# Patient Record
Sex: FEMALE | Race: WHITE | NOT HISPANIC OR LATINO | Employment: OTHER | URBAN - METROPOLITAN AREA
[De-identification: names, ages, dates, MRNs, and addresses within clinical notes are randomized per-mention and may not be internally consistent; named-entity substitution may affect disease eponyms.]

---

## 2023-06-06 ENCOUNTER — OFFICE VISIT (OUTPATIENT)
Dept: PRIMARY CARE | Facility: CLINIC | Age: 68
End: 2023-06-06
Payer: MEDICARE

## 2023-06-06 VITALS
SYSTOLIC BLOOD PRESSURE: 122 MMHG | HEIGHT: 66 IN | HEART RATE: 92 BPM | TEMPERATURE: 97.6 F | WEIGHT: 195.8 LBS | DIASTOLIC BLOOD PRESSURE: 68 MMHG | OXYGEN SATURATION: 97 % | BODY MASS INDEX: 31.47 KG/M2

## 2023-06-06 DIAGNOSIS — E78.5 BORDERLINE HYPERLIPIDEMIA: ICD-10-CM

## 2023-06-06 DIAGNOSIS — I15.9 BENIGN SECONDARY HYPERTENSION: Primary | ICD-10-CM

## 2023-06-06 DIAGNOSIS — M75.21 BICEPS TENDINITIS OF RIGHT UPPER EXTREMITY: ICD-10-CM

## 2023-06-06 DIAGNOSIS — M75.52 BURSITIS OF LEFT SHOULDER: ICD-10-CM

## 2023-06-06 DIAGNOSIS — K21.9 GASTROESOPHAGEAL REFLUX DISEASE WITHOUT ESOPHAGITIS: ICD-10-CM

## 2023-06-06 DIAGNOSIS — F51.01 PRIMARY INSOMNIA: ICD-10-CM

## 2023-06-06 PROBLEM — M75.121 COMPLETE TEAR OF RIGHT ROTATOR CUFF: Status: ACTIVE | Noted: 2023-06-06

## 2023-06-06 PROBLEM — M51.9 LUMBAR DISC DISEASE: Status: ACTIVE | Noted: 2023-06-06

## 2023-06-06 PROBLEM — K29.70 GASTRITIS: Status: ACTIVE | Noted: 2023-06-06

## 2023-06-06 PROBLEM — H81.10 BENIGN POSITIONAL VERTIGO: Status: ACTIVE | Noted: 2023-06-06

## 2023-06-06 PROBLEM — R53.83 FATIGUE: Status: ACTIVE | Noted: 2023-06-06

## 2023-06-06 PROBLEM — M67.40 GANGLION CYST: Status: ACTIVE | Noted: 2023-06-06

## 2023-06-06 PROBLEM — M50.90 CERVICAL DISC DISEASE: Status: ACTIVE | Noted: 2023-06-06

## 2023-06-06 PROBLEM — K58.9 IRRITABLE BOWEL SYNDROME: Status: ACTIVE | Noted: 2023-06-06

## 2023-06-06 PROBLEM — M17.12 OSTEOARTHRITIS OF LEFT KNEE: Status: ACTIVE | Noted: 2023-06-06

## 2023-06-06 PROBLEM — L72.3 INFECTED SEBACEOUS CYST: Status: ACTIVE | Noted: 2023-06-06

## 2023-06-06 PROBLEM — M75.121 COMPLETE TEAR OF RIGHT ROTATOR CUFF: Status: RESOLVED | Noted: 2023-06-06 | Resolved: 2023-06-06

## 2023-06-06 PROBLEM — H26.9 CATARACTS, BILATERAL: Status: ACTIVE | Noted: 2023-06-06

## 2023-06-06 PROBLEM — G47.00 INSOMNIA: Status: ACTIVE | Noted: 2023-06-06

## 2023-06-06 PROBLEM — J30.1 ALLERGIC RHINITIS DUE TO POLLEN: Status: ACTIVE | Noted: 2023-06-06

## 2023-06-06 PROBLEM — R10.84 GENERALIZED ABDOMINAL PAIN: Status: ACTIVE | Noted: 2023-06-06

## 2023-06-06 PROBLEM — K22.70 BARRETT'S ESOPHAGUS: Status: ACTIVE | Noted: 2023-06-06

## 2023-06-06 PROBLEM — I82.402 LEFT LEG DVT (MULTI): Status: ACTIVE | Noted: 2023-06-06

## 2023-06-06 PROBLEM — K82.4 GALLBLADDER POLYP: Status: ACTIVE | Noted: 2023-06-06

## 2023-06-06 PROBLEM — L08.9 INFECTED SEBACEOUS CYST: Status: ACTIVE | Noted: 2023-06-06

## 2023-06-06 PROCEDURE — 96372 THER/PROPH/DIAG INJ SC/IM: CPT | Performed by: FAMILY MEDICINE

## 2023-06-06 PROCEDURE — 1160F RVW MEDS BY RX/DR IN RCRD: CPT | Performed by: FAMILY MEDICINE

## 2023-06-06 PROCEDURE — 3078F DIAST BP <80 MM HG: CPT | Performed by: FAMILY MEDICINE

## 2023-06-06 PROCEDURE — 99214 OFFICE O/P EST MOD 30 MIN: CPT | Performed by: FAMILY MEDICINE

## 2023-06-06 PROCEDURE — 1036F TOBACCO NON-USER: CPT | Performed by: FAMILY MEDICINE

## 2023-06-06 PROCEDURE — 3074F SYST BP LT 130 MM HG: CPT | Performed by: FAMILY MEDICINE

## 2023-06-06 PROCEDURE — 1159F MED LIST DOCD IN RCRD: CPT | Performed by: FAMILY MEDICINE

## 2023-06-06 RX ORDER — BENZONATATE 200 MG/1
CAPSULE ORAL
COMMUNITY
Start: 2022-10-03 | End: 2023-06-06 | Stop reason: ALTCHOICE

## 2023-06-06 RX ORDER — HYDROCHLOROTHIAZIDE 25 MG/1
25 TABLET ORAL DAILY
COMMUNITY
End: 2023-06-06 | Stop reason: SDUPTHER

## 2023-06-06 RX ORDER — LIDOCAINE HYDROCHLORIDE 10 MG/ML
1 INJECTION INFILTRATION; PERINEURAL ONCE
Status: COMPLETED | OUTPATIENT
Start: 2023-06-06 | End: 2023-06-06

## 2023-06-06 RX ORDER — PANTOPRAZOLE SODIUM 40 MG/1
1 TABLET, DELAYED RELEASE ORAL DAILY
COMMUNITY
End: 2023-06-06 | Stop reason: SDUPTHER

## 2023-06-06 RX ORDER — ROSUVASTATIN CALCIUM 10 MG/1
10 TABLET, COATED ORAL DAILY
COMMUNITY
End: 2023-10-09

## 2023-06-06 RX ORDER — PANTOPRAZOLE SODIUM 40 MG/1
40 TABLET, DELAYED RELEASE ORAL DAILY
Qty: 30 TABLET | Refills: 2 | Status: SHIPPED | OUTPATIENT
Start: 2023-06-06 | End: 2024-05-14

## 2023-06-06 RX ORDER — BROMPHENIRAMINE MALEATE, PSEUDOEPHEDRINE HYDROCHLORIDE, AND DEXTROMETHORPHAN HYDROBROMIDE 2; 30; 10 MG/5ML; MG/5ML; MG/5ML
SYRUP ORAL
COMMUNITY
Start: 2022-10-03 | End: 2023-06-06 | Stop reason: ALTCHOICE

## 2023-06-06 RX ORDER — TRAZODONE HYDROCHLORIDE 50 MG/1
50 TABLET ORAL NIGHTLY PRN
Qty: 30 TABLET | Refills: 5 | Status: SHIPPED | OUTPATIENT
Start: 2023-06-06 | End: 2024-06-05

## 2023-06-06 RX ORDER — MELOXICAM 15 MG/1
15 TABLET ORAL DAILY
Qty: 90 TABLET | Refills: 3 | Status: SHIPPED | OUTPATIENT
Start: 2023-06-06

## 2023-06-06 RX ORDER — MELOXICAM 15 MG/1
15 TABLET ORAL DAILY
COMMUNITY
End: 2023-06-06 | Stop reason: SDUPTHER

## 2023-06-06 RX ORDER — METHYLPREDNISOLONE ACETATE 80 MG/ML
80 INJECTION, SUSPENSION INTRA-ARTICULAR; INTRALESIONAL; INTRAMUSCULAR; SOFT TISSUE ONCE
Status: COMPLETED | OUTPATIENT
Start: 2023-06-06 | End: 2023-06-06

## 2023-06-06 RX ORDER — PREDNISONE 20 MG/1
40 TABLET ORAL DAILY
COMMUNITY
Start: 2022-10-05 | End: 2023-06-06 | Stop reason: ALTCHOICE

## 2023-06-06 RX ORDER — ALBUTEROL SULFATE 90 UG/1
AEROSOL, METERED RESPIRATORY (INHALATION)
COMMUNITY
Start: 2022-10-05

## 2023-06-06 RX ORDER — PREDNISOLONE ACETATE 10 MG/ML
SUSPENSION/ DROPS OPHTHALMIC
COMMUNITY
Start: 2023-04-10

## 2023-06-06 RX ORDER — HYDROCHLOROTHIAZIDE 25 MG/1
25 TABLET ORAL DAILY
Qty: 90 TABLET | Refills: 3 | Status: SHIPPED | OUTPATIENT
Start: 2023-06-06

## 2023-06-06 RX ORDER — MOXIFLOXACIN 5 MG/ML
SOLUTION/ DROPS OPHTHALMIC
COMMUNITY
Start: 2023-04-10 | End: 2023-10-30 | Stop reason: ALTCHOICE

## 2023-06-06 RX ADMIN — LIDOCAINE HYDROCHLORIDE 10 MG: 10 INJECTION INFILTRATION; PERINEURAL at 17:06

## 2023-06-06 RX ADMIN — METHYLPREDNISOLONE ACETATE 80 MG: 80 INJECTION, SUSPENSION INTRA-ARTICULAR; INTRALESIONAL; INTRAMUSCULAR; SOFT TISSUE at 17:07

## 2023-06-06 ASSESSMENT — PAIN SCALES - GENERAL: PAINLEVEL: 7

## 2023-06-06 ASSESSMENT — ENCOUNTER SYMPTOMS
CONSTITUTIONAL NEGATIVE: 1
SLEEP DISTURBANCE: 1

## 2023-06-06 NOTE — PROGRESS NOTES
"Subjective   Patient ID: Adri Muir is a 68 y.o. female who presents for c/o (C/o right shoulder pain x2 wks).    HPI     Review of Systems   Constitutional: Negative.    Musculoskeletal:         Pain especially in the right shoulder although there is some pain in the left shoulder secondary to doing some drywalling at home.  She does have a history of a right rotator cuff repair   Psychiatric/Behavioral:  Positive for sleep disturbance.        Objective   /68 (BP Location: Left arm)   Pulse 92   Temp 36.4 °C (97.6 °F) (Temporal)   Ht 1.676 m (5' 6\")   Wt 88.8 kg (195 lb 12.8 oz)   SpO2 97%   BMI 31.60 kg/m²     Physical Exam  Vitals and nursing note reviewed.   Cardiovascular:      Rate and Rhythm: Regular rhythm.   Pulmonary:      Breath sounds: Normal breath sounds.   Musculoskeletal:      Comments: Decreased elevation of the right shoulder with tenderness in her biceps tendon.  Rotator cuff is intact.     Patient ID: Adri Muir is a 68 y.o. female.    Procedures after sterile preparation the right shoulder was injected with Decadron Depo-Medrol and lidocaine.    Assessment/Plan   Problem List Items Addressed This Visit          Nervous    Insomnia       Circulatory    Benign secondary hypertension - Primary       Digestive    Gastroesophageal reflux disease       Musculoskeletal    Bursitis of left shoulder       Other    Borderline hyperlipidemia     Other Visit Diagnoses       Biceps tendinitis of right upper extremity                   "

## 2023-06-15 ENCOUNTER — LAB (OUTPATIENT)
Dept: LAB | Facility: LAB | Age: 68
End: 2023-06-15
Payer: MEDICARE

## 2023-06-15 DIAGNOSIS — E78.5 BORDERLINE HYPERLIPIDEMIA: ICD-10-CM

## 2023-06-15 LAB
CHOLESTEROL (MG/DL) IN SER/PLAS: 220 MG/DL (ref 0–199)
CHOLESTEROL IN HDL (MG/DL) IN SER/PLAS: 79.6 MG/DL
CHOLESTEROL/HDL RATIO: 2.8
LDL: 126 MG/DL (ref 0–99)
TRIGLYCERIDE (MG/DL) IN SER/PLAS: 71 MG/DL (ref 0–149)
VLDL: 14 MG/DL (ref 0–40)

## 2023-06-15 PROCEDURE — 36415 COLL VENOUS BLD VENIPUNCTURE: CPT

## 2023-06-15 PROCEDURE — 80061 LIPID PANEL: CPT

## 2023-07-10 PROBLEM — K80.20 GALLSTONES: Status: ACTIVE | Noted: 2023-07-10

## 2023-07-10 PROBLEM — K83.5 BILIARY CYST: Status: ACTIVE | Noted: 2023-07-10

## 2023-07-10 PROBLEM — K21.9 ESOPHAGEAL REFLUX: Status: ACTIVE | Noted: 2023-07-10

## 2023-07-10 PROBLEM — H00.029 INTERNAL HORDEOLUM: Status: ACTIVE | Noted: 2023-07-10

## 2023-07-10 RX ORDER — ACETAMINOPHEN AND CODEINE PHOSPHATE 300; 30 MG/1; MG/1
TABLET ORAL
COMMUNITY
End: 2023-10-30 | Stop reason: ALTCHOICE

## 2023-07-10 RX ORDER — LEVOCETIRIZINE DIHYDROCHLORIDE 5 MG/1
5 TABLET, FILM COATED ORAL
COMMUNITY
Start: 2017-02-08

## 2023-07-10 RX ORDER — MECLIZINE HYDROCHLORIDE 25 MG/1
25 TABLET ORAL
COMMUNITY
Start: 2016-06-28 | End: 2023-10-30 | Stop reason: ALTCHOICE

## 2023-07-10 RX ORDER — MISOPROSTOL 200 UG/1
TABLET ORAL
COMMUNITY
End: 2023-10-30 | Stop reason: ALTCHOICE

## 2023-07-10 RX ORDER — OXYCODONE AND ACETAMINOPHEN 5; 325 MG/1; MG/1
TABLET ORAL
COMMUNITY
Start: 2017-09-14 | End: 2023-10-30 | Stop reason: ALTCHOICE

## 2023-07-10 RX ORDER — FAMOTIDINE 20 MG/1
20 TABLET, FILM COATED ORAL 2 TIMES DAILY
COMMUNITY
Start: 2017-08-28 | End: 2023-10-30 | Stop reason: ALTCHOICE

## 2023-07-10 RX ORDER — CROMOLYN SODIUM 40 MG/ML
SOLUTION/ DROPS OPHTHALMIC
COMMUNITY

## 2023-07-10 RX ORDER — ONDANSETRON 4 MG/1
4 TABLET, FILM COATED ORAL EVERY 8 HOURS PRN
COMMUNITY
Start: 2020-12-16 | End: 2023-10-30 | Stop reason: ALTCHOICE

## 2023-07-21 ENCOUNTER — OFFICE VISIT (OUTPATIENT)
Dept: PRIMARY CARE | Facility: CLINIC | Age: 68
End: 2023-07-21
Payer: MEDICARE

## 2023-07-21 VITALS
WEIGHT: 191.6 LBS | DIASTOLIC BLOOD PRESSURE: 78 MMHG | OXYGEN SATURATION: 98 % | HEIGHT: 66 IN | TEMPERATURE: 97.6 F | SYSTOLIC BLOOD PRESSURE: 122 MMHG | HEART RATE: 63 BPM | BODY MASS INDEX: 30.79 KG/M2

## 2023-07-21 DIAGNOSIS — M75.21 BICEPS TENDINITIS OF RIGHT UPPER EXTREMITY: Primary | ICD-10-CM

## 2023-07-21 PROCEDURE — 99213 OFFICE O/P EST LOW 20 MIN: CPT | Performed by: FAMILY MEDICINE

## 2023-07-21 PROCEDURE — 1125F AMNT PAIN NOTED PAIN PRSNT: CPT | Performed by: FAMILY MEDICINE

## 2023-07-21 PROCEDURE — 1159F MED LIST DOCD IN RCRD: CPT | Performed by: FAMILY MEDICINE

## 2023-07-21 PROCEDURE — 1160F RVW MEDS BY RX/DR IN RCRD: CPT | Performed by: FAMILY MEDICINE

## 2023-07-21 PROCEDURE — 3078F DIAST BP <80 MM HG: CPT | Performed by: FAMILY MEDICINE

## 2023-07-21 PROCEDURE — 3074F SYST BP LT 130 MM HG: CPT | Performed by: FAMILY MEDICINE

## 2023-07-21 PROCEDURE — 1036F TOBACCO NON-USER: CPT | Performed by: FAMILY MEDICINE

## 2023-07-21 RX ORDER — METHYLPREDNISOLONE 4 MG/1
TABLET ORAL
Qty: 21 TABLET | Refills: 0 | Status: SHIPPED | OUTPATIENT
Start: 2023-07-21 | End: 2023-07-28

## 2023-07-21 ASSESSMENT — PAIN SCALES - GENERAL: PAINLEVEL: 6

## 2023-07-21 NOTE — PROGRESS NOTES
"Subjective   Patient ID: Adri Muir is a 68 y.o. female who presents for c/o (C/o right shoulder problems ).    HPI     Review of Systems   Musculoskeletal:         Persistent pain right shoulder with range of motion       Objective   /78 (BP Location: Left arm)   Pulse 63   Temp 36.4 °C (97.6 °F) (Temporal)   Ht 1.676 m (5' 6\")   Wt 86.9 kg (191 lb 9.6 oz)   SpO2 98%   BMI 30.93 kg/m²     Physical Exam  Vitals and nursing note reviewed.   Musculoskeletal:      Comments: Tenderness right shoulder at the biceps tendon.  Full range of motion rotator cuff is intact         Assessment/Plan   Problem List Items Addressed This Visit    None  Visit Diagnoses       Biceps tendinitis of right upper extremity    -  Primary    Relevant Medications    methylPREDNISolone (Medrol Dospak) 4 mg tablets               "

## 2023-10-09 DIAGNOSIS — E78.5 HYPERLIPIDEMIA, UNSPECIFIED: ICD-10-CM

## 2023-10-09 RX ORDER — BENZONATATE 200 MG/1
CAPSULE ORAL
COMMUNITY
End: 2023-10-30 | Stop reason: ALTCHOICE

## 2023-10-09 RX ORDER — ROSUVASTATIN CALCIUM 10 MG/1
10 TABLET, COATED ORAL DAILY
Qty: 90 TABLET | Refills: 3 | Status: SHIPPED | OUTPATIENT
Start: 2023-10-09

## 2023-10-09 RX ORDER — AZITHROMYCIN 250 MG/1
TABLET, FILM COATED ORAL
COMMUNITY
End: 2023-10-30 | Stop reason: ALTCHOICE

## 2023-10-09 RX ORDER — BROMPHENIRAMINE MALEATE, PSEUDOEPHEDRINE HYDROCHLORIDE, AND DEXTROMETHORPHAN HYDROBROMIDE 2; 30; 10 MG/5ML; MG/5ML; MG/5ML
SYRUP ORAL
COMMUNITY
End: 2023-10-30 | Stop reason: ALTCHOICE

## 2023-10-09 RX ORDER — PREDNISONE 20 MG/1
2 TABLET ORAL DAILY
COMMUNITY
End: 2023-10-30 | Stop reason: ALTCHOICE

## 2023-10-16 ENCOUNTER — OFFICE VISIT (OUTPATIENT)
Dept: PRIMARY CARE | Facility: CLINIC | Age: 68
End: 2023-10-16
Payer: MEDICARE

## 2023-10-16 VITALS
OXYGEN SATURATION: 96 % | BODY MASS INDEX: 29.92 KG/M2 | DIASTOLIC BLOOD PRESSURE: 74 MMHG | HEIGHT: 66 IN | HEART RATE: 72 BPM | TEMPERATURE: 97.6 F | SYSTOLIC BLOOD PRESSURE: 122 MMHG | WEIGHT: 186.2 LBS

## 2023-10-16 DIAGNOSIS — M75.21 BICEPS TENDINITIS OF RIGHT UPPER EXTREMITY: Primary | ICD-10-CM

## 2023-10-16 DIAGNOSIS — I15.9 BENIGN SECONDARY HYPERTENSION: ICD-10-CM

## 2023-10-16 DIAGNOSIS — E78.5 BORDERLINE HYPERLIPIDEMIA: ICD-10-CM

## 2023-10-16 PROCEDURE — 1125F AMNT PAIN NOTED PAIN PRSNT: CPT | Performed by: FAMILY MEDICINE

## 2023-10-16 PROCEDURE — 1160F RVW MEDS BY RX/DR IN RCRD: CPT | Performed by: FAMILY MEDICINE

## 2023-10-16 PROCEDURE — 99213 OFFICE O/P EST LOW 20 MIN: CPT | Performed by: FAMILY MEDICINE

## 2023-10-16 PROCEDURE — 1159F MED LIST DOCD IN RCRD: CPT | Performed by: FAMILY MEDICINE

## 2023-10-16 PROCEDURE — 1036F TOBACCO NON-USER: CPT | Performed by: FAMILY MEDICINE

## 2023-10-16 PROCEDURE — 3078F DIAST BP <80 MM HG: CPT | Performed by: FAMILY MEDICINE

## 2023-10-16 PROCEDURE — 3074F SYST BP LT 130 MM HG: CPT | Performed by: FAMILY MEDICINE

## 2023-10-16 RX ORDER — METHYLPREDNISOLONE 4 MG/1
TABLET ORAL
Qty: 21 TABLET | Refills: 0 | Status: SHIPPED | OUTPATIENT
Start: 2023-10-16 | End: 2023-10-30 | Stop reason: ALTCHOICE

## 2023-10-16 SDOH — ECONOMIC STABILITY: FOOD INSECURITY: WITHIN THE PAST 12 MONTHS, THE FOOD YOU BOUGHT JUST DIDN'T LAST AND YOU DIDN'T HAVE MONEY TO GET MORE.: NEVER TRUE

## 2023-10-16 SDOH — ECONOMIC STABILITY: TRANSPORTATION INSECURITY
IN THE PAST 12 MONTHS, HAS LACK OF TRANSPORTATION KEPT YOU FROM MEETINGS, WORK, OR FROM GETTING THINGS NEEDED FOR DAILY LIVING?: NO

## 2023-10-16 SDOH — ECONOMIC STABILITY: INCOME INSECURITY: IN THE LAST 12 MONTHS, WAS THERE A TIME WHEN YOU WERE NOT ABLE TO PAY THE MORTGAGE OR RENT ON TIME?: NO

## 2023-10-16 SDOH — ECONOMIC STABILITY: TRANSPORTATION INSECURITY
IN THE PAST 12 MONTHS, HAS THE LACK OF TRANSPORTATION KEPT YOU FROM MEDICAL APPOINTMENTS OR FROM GETTING MEDICATIONS?: NO

## 2023-10-16 SDOH — ECONOMIC STABILITY: FOOD INSECURITY: WITHIN THE PAST 12 MONTHS, YOU WORRIED THAT YOUR FOOD WOULD RUN OUT BEFORE YOU GOT MONEY TO BUY MORE.: NEVER TRUE

## 2023-10-16 SDOH — ECONOMIC STABILITY: HOUSING INSECURITY
IN THE LAST 12 MONTHS, WAS THERE A TIME WHEN YOU DID NOT HAVE A STEADY PLACE TO SLEEP OR SLEPT IN A SHELTER (INCLUDING NOW)?: NO

## 2023-10-16 ASSESSMENT — ENCOUNTER SYMPTOMS
GASTROINTESTINAL NEGATIVE: 1
LOSS OF SENSATION IN FEET: 0
DEPRESSION: 0
CARDIOVASCULAR NEGATIVE: 1
ARTHRALGIAS: 1
CONSTITUTIONAL NEGATIVE: 1
OCCASIONAL FEELINGS OF UNSTEADINESS: 0

## 2023-10-16 ASSESSMENT — SOCIAL DETERMINANTS OF HEALTH (SDOH)
WITHIN THE LAST YEAR, HAVE YOU BEEN HUMILIATED OR EMOTIONALLY ABUSED IN OTHER WAYS BY YOUR PARTNER OR EX-PARTNER?: NO
IN THE PAST 12 MONTHS, HAS THE ELECTRIC, GAS, OIL, OR WATER COMPANY THREATENED TO SHUT OFF SERVICE IN YOUR HOME?: NO
HOW HARD IS IT FOR YOU TO PAY FOR THE VERY BASICS LIKE FOOD, HOUSING, MEDICAL CARE, AND HEATING?: NOT HARD AT ALL
WITHIN THE LAST YEAR, HAVE YOU BEEN KICKED, HIT, SLAPPED, OR OTHERWISE PHYSICALLY HURT BY YOUR PARTNER OR EX-PARTNER?: NO
WITHIN THE LAST YEAR, HAVE YOU BEEN AFRAID OF YOUR PARTNER OR EX-PARTNER?: NO
WITHIN THE LAST YEAR, HAVE TO BEEN RAPED OR FORCED TO HAVE ANY KIND OF SEXUAL ACTIVITY BY YOUR PARTNER OR EX-PARTNER?: NO

## 2023-10-16 ASSESSMENT — LIFESTYLE VARIABLES
HOW OFTEN DO YOU HAVE SIX OR MORE DRINKS ON ONE OCCASION: NEVER
HOW OFTEN DO YOU HAVE A DRINK CONTAINING ALCOHOL: NEVER
SKIP TO QUESTIONS 9-10: 1
AUDIT-C TOTAL SCORE: 0
HOW MANY STANDARD DRINKS CONTAINING ALCOHOL DO YOU HAVE ON A TYPICAL DAY: PATIENT DOES NOT DRINK

## 2023-10-16 ASSESSMENT — PAIN SCALES - GENERAL: PAINLEVEL: 3

## 2023-10-16 ASSESSMENT — PATIENT HEALTH QUESTIONNAIRE - PHQ9
2. FEELING DOWN, DEPRESSED OR HOPELESS: NOT AT ALL
SUM OF ALL RESPONSES TO PHQ9 QUESTIONS 1 & 2: 0
1. LITTLE INTEREST OR PLEASURE IN DOING THINGS: NOT AT ALL

## 2023-10-16 NOTE — PROGRESS NOTES
"Subjective   Patient ID: Adri Muir is a 68 y.o. female who presents for misc (Right shoulder problems ).    HPI     Review of Systems   Constitutional: Negative.    Cardiovascular: Negative.    Gastrointestinal: Negative.    Musculoskeletal:  Positive for arthralgias.        Right shoulder and left hip       Objective   /74 (BP Location: Left arm)   Pulse 72   Temp 36.4 °C (97.6 °F) (Temporal)   Ht 1.676 m (5' 6\")   Wt 84.5 kg (186 lb 3.2 oz)   SpO2 96%   BMI 30.05 kg/m²     Physical Exam  Vitals and nursing note reviewed.   Cardiovascular:      Rate and Rhythm: Regular rhythm.      Heart sounds: Normal heart sounds.   Musculoskeletal:         General: Tenderness present. Normal range of motion.   Neurological:      Mental Status: She is alert.         Assessment/Plan patient seen here to follow-up on her right shoulder discomfort and her left chest discomfort which seem to be markedly improved with physical therapy and exercise.  We are giving her 1 more dose of the Medrol Dosepak.  We will see her back as needed.  I also gave her lab work to recheck her cholesterol         "

## 2023-10-30 ENCOUNTER — OFFICE VISIT (OUTPATIENT)
Dept: PRIMARY CARE | Facility: CLINIC | Age: 68
End: 2023-10-30
Payer: MEDICARE

## 2023-10-30 ENCOUNTER — LAB (OUTPATIENT)
Dept: LAB | Facility: LAB | Age: 68
End: 2023-10-30
Payer: MEDICARE

## 2023-10-30 VITALS
BODY MASS INDEX: 29.38 KG/M2 | DIASTOLIC BLOOD PRESSURE: 80 MMHG | HEIGHT: 66 IN | SYSTOLIC BLOOD PRESSURE: 122 MMHG | TEMPERATURE: 97.7 F | WEIGHT: 182.8 LBS

## 2023-10-30 DIAGNOSIS — I15.9 BENIGN SECONDARY HYPERTENSION: ICD-10-CM

## 2023-10-30 DIAGNOSIS — Z12.31 SCREENING MAMMOGRAM FOR BREAST CANCER: ICD-10-CM

## 2023-10-30 DIAGNOSIS — G47.00 INSOMNIA, UNSPECIFIED TYPE: ICD-10-CM

## 2023-10-30 DIAGNOSIS — E78.5 BORDERLINE HYPERLIPIDEMIA: ICD-10-CM

## 2023-10-30 DIAGNOSIS — K22.70 BARRETT'S ESOPHAGUS WITHOUT DYSPLASIA: ICD-10-CM

## 2023-10-30 DIAGNOSIS — M81.0 AGE-RELATED OSTEOPOROSIS WITHOUT CURRENT PATHOLOGICAL FRACTURE: ICD-10-CM

## 2023-10-30 DIAGNOSIS — J30.1 SEASONAL ALLERGIC RHINITIS DUE TO POLLEN: ICD-10-CM

## 2023-10-30 DIAGNOSIS — Z00.00 MEDICARE ANNUAL WELLNESS VISIT, SUBSEQUENT: Primary | ICD-10-CM

## 2023-10-30 PROBLEM — I82.402 LEFT LEG DVT (MULTI): Status: RESOLVED | Noted: 2023-06-06 | Resolved: 2023-10-30

## 2023-10-30 LAB
ALBUMIN SERPL BCP-MCNC: 4.1 G/DL (ref 3.4–5)
ALP SERPL-CCNC: 38 U/L (ref 33–136)
ALT SERPL W P-5'-P-CCNC: 16 U/L (ref 7–45)
ANION GAP SERPL CALC-SCNC: 11 MMOL/L (ref 10–20)
AST SERPL W P-5'-P-CCNC: 17 U/L (ref 9–39)
BILIRUB SERPL-MCNC: 0.5 MG/DL (ref 0–1.2)
BUN SERPL-MCNC: 17 MG/DL (ref 6–23)
CALCIUM SERPL-MCNC: 8.8 MG/DL (ref 8.6–10.3)
CHLORIDE SERPL-SCNC: 110 MMOL/L (ref 98–107)
CHOLEST SERPL-MCNC: 144 MG/DL (ref 0–199)
CHOLESTEROL/HDL RATIO: 2
CO2 SERPL-SCNC: 27 MMOL/L (ref 21–32)
CREAT SERPL-MCNC: 0.99 MG/DL (ref 0.5–1.05)
GFR SERPL CREATININE-BSD FRML MDRD: 61 ML/MIN/1.73M*2
GLUCOSE SERPL-MCNC: 94 MG/DL (ref 74–99)
HDLC SERPL-MCNC: 71.9 MG/DL
LDLC SERPL CALC-MCNC: 59 MG/DL
NON HDL CHOLESTEROL: 72 MG/DL (ref 0–149)
POTASSIUM SERPL-SCNC: 5.1 MMOL/L (ref 3.5–5.3)
PROT SERPL-MCNC: 5.9 G/DL (ref 6.4–8.2)
SODIUM SERPL-SCNC: 143 MMOL/L (ref 136–145)
TRIGL SERPL-MCNC: 68 MG/DL (ref 0–149)
VLDL: 14 MG/DL (ref 0–40)

## 2023-10-30 PROCEDURE — 1158F ADVNC CARE PLAN TLK DOCD: CPT | Performed by: FAMILY MEDICINE

## 2023-10-30 PROCEDURE — 3074F SYST BP LT 130 MM HG: CPT | Performed by: FAMILY MEDICINE

## 2023-10-30 PROCEDURE — 3008F BODY MASS INDEX DOCD: CPT | Performed by: FAMILY MEDICINE

## 2023-10-30 PROCEDURE — 3079F DIAST BP 80-89 MM HG: CPT | Performed by: FAMILY MEDICINE

## 2023-10-30 PROCEDURE — 36415 COLL VENOUS BLD VENIPUNCTURE: CPT

## 2023-10-30 PROCEDURE — G0439 PPPS, SUBSEQ VISIT: HCPCS | Performed by: FAMILY MEDICINE

## 2023-10-30 PROCEDURE — 99397 PER PM REEVAL EST PAT 65+ YR: CPT | Performed by: FAMILY MEDICINE

## 2023-10-30 PROCEDURE — 1160F RVW MEDS BY RX/DR IN RCRD: CPT | Performed by: FAMILY MEDICINE

## 2023-10-30 PROCEDURE — 1036F TOBACCO NON-USER: CPT | Performed by: FAMILY MEDICINE

## 2023-10-30 PROCEDURE — 99497 ADVNCD CARE PLAN 30 MIN: CPT | Performed by: FAMILY MEDICINE

## 2023-10-30 PROCEDURE — 80053 COMPREHEN METABOLIC PANEL: CPT

## 2023-10-30 PROCEDURE — 1159F MED LIST DOCD IN RCRD: CPT | Performed by: FAMILY MEDICINE

## 2023-10-30 PROCEDURE — 1170F FXNL STATUS ASSESSED: CPT | Performed by: FAMILY MEDICINE

## 2023-10-30 PROCEDURE — 1126F AMNT PAIN NOTED NONE PRSNT: CPT | Performed by: FAMILY MEDICINE

## 2023-10-30 PROCEDURE — 80061 LIPID PANEL: CPT

## 2023-10-30 ASSESSMENT — ENCOUNTER SYMPTOMS
ENDOCRINE NEGATIVE: 1
CARDIOVASCULAR NEGATIVE: 1
GASTROINTESTINAL NEGATIVE: 1
NEUROLOGICAL NEGATIVE: 1
CONSTITUTIONAL NEGATIVE: 1
LOSS OF SENSATION IN FEET: 0
MUSCULOSKELETAL NEGATIVE: 1
RESPIRATORY NEGATIVE: 1
SLEEP DISTURBANCE: 1
DEPRESSION: 0
OCCASIONAL FEELINGS OF UNSTEADINESS: 0

## 2023-10-30 ASSESSMENT — ACTIVITIES OF DAILY LIVING (ADL)
GROOMING: INDEPENDENT
STIL DRIVING: YES
USING TRANSPORTATION: INDEPENDENT
FEEDING YOURSELF: INDEPENDENT
DOING HOUSEWORK: INDEPENDENT
DRESSING: INDEPENDENT
PATIENT'S MEMORY ADEQUATE TO SAFELY COMPLETE DAILY ACTIVITIES?: YES
JUDGMENT_ADEQUATE_SAFELY_COMPLETE_DAILY_ACTIVITIES: YES
TOILETING: INDEPENDENT
TOILETING: INDEPENDENT
WALKS IN HOME: INDEPENDENT
JUDGMENT_ADEQUATE_SAFELY_COMPLETE_DAILY_ACTIVITIES: YES
MANAGING FINANCES: INDEPENDENT
USING TELEPHONE: INDEPENDENT
BATHING: INDEPENDENT
EATING: INDEPENDENT
PREPARING MEALS: INDEPENDENT
ADEQUATE_TO_COMPLETE_ADL: YES
GROCERY SHOPPING: INDEPENDENT
NEEDS ASSISTANCE WITH FOOD: INDEPENDENT
BATHING: INDEPENDENT
TAKING MEDICATION: INDEPENDENT
DRESSING YOURSELF: INDEPENDENT
ADEQUATE_TO_COMPLETE_ADL: YES
FEEDING: INDEPENDENT

## 2023-10-30 ASSESSMENT — GERIATRIC MINI NUTRITIONAL ASSESSMENT (MNA)
A HAS FOOD INTAKE DECLINED OVER THE PAST 3 MONTHS DUE TO LOSS OF APPETITE, DIGESTIVE PROBLEMS, CHEWING OR SWALLOWING DIFFICULTIES?: NO DECREASE IN FOOD INTAKE
E NEUROPSYCHOLOGICAL PROBLEMS: NO PSYCHOLOGICAL PROBLEMS
B WEIGHT LOSS DURING THE LAST 3 MONTHS: NO WEIGHT LOSS
D HAS SUFFERED PSYCHOLOGICAL STRESS OR ACUTE DISEASE IN THE PAST 3 MONTHS?: NO
C GENERAL MOBILITY: GOES OUT

## 2023-10-30 ASSESSMENT — ANXIETY QUESTIONNAIRES
7. FEELING AFRAID AS IF SOMETHING AWFUL MIGHT HAPPEN: NOT AT ALL
3. WORRYING TOO MUCH ABOUT DIFFERENT THINGS: NOT AT ALL
1. FEELING NERVOUS, ANXIOUS, OR ON EDGE: NOT AT ALL
5. BEING SO RESTLESS THAT IT IS HARD TO SIT STILL: NOT AT ALL
2. NOT BEING ABLE TO STOP OR CONTROL WORRYING: NOT AT ALL
6. BECOMING EASILY ANNOYED OR IRRITABLE: NOT AT ALL
GAD7 TOTAL SCORE: 0
4. TROUBLE RELAXING: NOT AT ALL

## 2023-10-30 ASSESSMENT — PAIN SCALES - GENERAL: PAINLEVEL: 0-NO PAIN

## 2023-10-30 NOTE — PROGRESS NOTES
"Subjective   Patient ID: Adri Muir is a 68 y.o. female who presents for Annual Exam (Annual medicare wellness bw already/).    HPI     Review of Systems   Constitutional: Negative.    HENT: Negative.     Respiratory: Negative.     Cardiovascular: Negative.    Gastrointestinal: Negative.    Endocrine: Negative.    Genitourinary: Negative.    Musculoskeletal: Negative.    Neurological: Negative.    Psychiatric/Behavioral:  Positive for sleep disturbance.        Objective   /80 (BP Location: Left arm)   Temp 36.5 °C (97.7 °F) (Temporal)   Ht 1.676 m (5' 6\")   Wt 82.9 kg (182 lb 12.8 oz)   BMI 29.50 kg/m²     Physical Exam  Vitals and nursing note reviewed.   HENT:      Right Ear: Tympanic membrane normal.   Cardiovascular:      Rate and Rhythm: Normal rate and regular rhythm.      Pulses: Normal pulses.      Heart sounds: Normal heart sounds.   Pulmonary:      Breath sounds: Normal breath sounds.   Abdominal:      Palpations: Abdomen is soft.   Musculoskeletal:         General: Normal range of motion.   Neurological:      General: No focal deficit present.      Mental Status: She is alert and oriented to person, place, and time.   Psychiatric:         Mood and Affect: Mood normal.         Behavior: Behavior normal.         Assessment/Plan patient seen here for an annual Medicare wellness exam.  We reviewed her questionnaire she is agreeable to her responses.  We did discuss advanced directives.  She has no significant difficulty with depression or anxiety.  She had her lab work drawn this morning to let her know the results.  We did give her a requisition for mammography and for bone density.  We will see her back in a year  Problem List Items Addressed This Visit             ICD-10-CM    Allergic rhinitis due to pollen J30.1    Dove's esophagus K22.70    Benign secondary hypertension I15.9    Borderline hyperlipidemia E78.5    Insomnia G47.00     Other Visit Diagnoses         Codes    Medicare " annual wellness visit, subsequent    -  Primary Z00.00    BMI 29.0-29.9,adult     Z68.29    Screening mammogram for breast cancer     Z12.31    Relevant Orders    BI mammo bilateral screening tomosynthesis    Age-related osteoporosis without current pathological fracture     M81.0    Relevant Orders    XR DEXA bone density

## 2023-11-09 ENCOUNTER — APPOINTMENT (OUTPATIENT)
Dept: RADIOLOGY | Facility: CLINIC | Age: 68
End: 2023-11-09
Payer: MEDICARE

## 2023-11-14 ENCOUNTER — APPOINTMENT (OUTPATIENT)
Dept: RADIOLOGY | Facility: CLINIC | Age: 68
End: 2023-11-14
Payer: MEDICARE

## 2023-11-20 ENCOUNTER — ANCILLARY PROCEDURE (OUTPATIENT)
Dept: RADIOLOGY | Facility: CLINIC | Age: 68
End: 2023-11-20
Payer: MEDICARE

## 2023-11-20 DIAGNOSIS — M81.0 AGE-RELATED OSTEOPOROSIS WITHOUT CURRENT PATHOLOGICAL FRACTURE: ICD-10-CM

## 2023-11-20 PROCEDURE — 77080 DXA BONE DENSITY AXIAL: CPT | Performed by: RADIOLOGY

## 2023-11-20 PROCEDURE — 77080 DXA BONE DENSITY AXIAL: CPT

## 2023-12-05 ENCOUNTER — TELEPHONE (OUTPATIENT)
Dept: PRIMARY CARE | Facility: CLINIC | Age: 68
End: 2023-12-05
Payer: MEDICARE

## 2023-12-29 ENCOUNTER — ANCILLARY PROCEDURE (OUTPATIENT)
Dept: RADIOLOGY | Facility: CLINIC | Age: 68
End: 2023-12-29
Payer: MEDICARE

## 2023-12-29 ENCOUNTER — OFFICE VISIT (OUTPATIENT)
Dept: PRIMARY CARE | Facility: CLINIC | Age: 68
End: 2023-12-29
Payer: MEDICARE

## 2023-12-29 VITALS
SYSTOLIC BLOOD PRESSURE: 110 MMHG | HEART RATE: 83 BPM | OXYGEN SATURATION: 94 % | TEMPERATURE: 97.6 F | WEIGHT: 180 LBS | DIASTOLIC BLOOD PRESSURE: 72 MMHG | HEIGHT: 66 IN | BODY MASS INDEX: 28.93 KG/M2

## 2023-12-29 DIAGNOSIS — S80.01XA CONTUSION OF RIGHT KNEE, INITIAL ENCOUNTER: ICD-10-CM

## 2023-12-29 DIAGNOSIS — S80.01XA CONTUSION OF RIGHT KNEE, INITIAL ENCOUNTER: Primary | ICD-10-CM

## 2023-12-29 PROCEDURE — 73564 X-RAY EXAM KNEE 4 OR MORE: CPT | Mod: RT

## 2023-12-29 PROCEDURE — 3078F DIAST BP <80 MM HG: CPT | Performed by: FAMILY MEDICINE

## 2023-12-29 PROCEDURE — 3074F SYST BP LT 130 MM HG: CPT | Performed by: FAMILY MEDICINE

## 2023-12-29 PROCEDURE — 1125F AMNT PAIN NOTED PAIN PRSNT: CPT | Performed by: FAMILY MEDICINE

## 2023-12-29 PROCEDURE — 99213 OFFICE O/P EST LOW 20 MIN: CPT | Performed by: FAMILY MEDICINE

## 2023-12-29 PROCEDURE — 1159F MED LIST DOCD IN RCRD: CPT | Performed by: FAMILY MEDICINE

## 2023-12-29 PROCEDURE — 1160F RVW MEDS BY RX/DR IN RCRD: CPT | Performed by: FAMILY MEDICINE

## 2023-12-29 PROCEDURE — 1036F TOBACCO NON-USER: CPT | Performed by: FAMILY MEDICINE

## 2023-12-29 PROCEDURE — 1158F ADVNC CARE PLAN TLK DOCD: CPT | Performed by: FAMILY MEDICINE

## 2023-12-29 PROCEDURE — 3008F BODY MASS INDEX DOCD: CPT | Performed by: FAMILY MEDICINE

## 2023-12-29 PROCEDURE — 73564 X-RAY EXAM KNEE 4 OR MORE: CPT | Mod: RIGHT SIDE | Performed by: RADIOLOGY

## 2023-12-29 RX ORDER — MOXIFLOXACIN 5 MG/ML
SOLUTION/ DROPS OPHTHALMIC
COMMUNITY
Start: 2023-12-04

## 2023-12-29 RX ORDER — AZITHROMYCIN 250 MG/1
TABLET, FILM COATED ORAL
COMMUNITY
Start: 2023-11-16

## 2023-12-29 ASSESSMENT — PAIN SCALES - GENERAL: PAINLEVEL: 10-WORST PAIN EVER

## 2023-12-29 ASSESSMENT — ENCOUNTER SYMPTOMS
OCCASIONAL FEELINGS OF UNSTEADINESS: 0
LOSS OF SENSATION IN FEET: 0
DEPRESSION: 0
CONSTITUTIONAL NEGATIVE: 1

## 2023-12-29 NOTE — PROGRESS NOTES
"Subjective   Patient ID: Adri Muir is a 68 y.o. female who presents for c/o  (C/o right knee pain fell x yestersday ).    HPI     Review of Systems   Constitutional: Negative.    Musculoskeletal:         Fell and injured right knee last night       Objective   /72 (BP Location: Left arm)   Pulse 83   Temp 36.4 °C (97.6 °F) (Temporal)   Ht 1.676 m (5' 6\")   Wt 81.6 kg (180 lb)   SpO2 94%   BMI 29.05 kg/m²     Physical Exam  Vitals and nursing note reviewed.   Musculoskeletal:      Comments: Tenderness patellar area of the right knee.  Full range of motion negative drawer sign there is some right lateral joint tenderness.  Positive Zeke's sign   Neurological:      Mental Status: She is alert.         Assessment/Plan patient seen here after suffering a fall injuring her right knee.  I have asked her to use ice and Aleve and Advil we are getting a stat x-ray of her right knee today  Problem List Items Addressed This Visit    None  Visit Diagnoses         Codes    Contusion of right knee, initial encounter    -  Primary S80.01XA    Relevant Orders    XR knee right 3 views               "

## 2024-03-15 ENCOUNTER — OFFICE VISIT (OUTPATIENT)
Dept: PRIMARY CARE | Facility: CLINIC | Age: 69
End: 2024-03-15
Payer: MEDICARE

## 2024-03-15 VITALS
OXYGEN SATURATION: 97 % | TEMPERATURE: 97.7 F | SYSTOLIC BLOOD PRESSURE: 130 MMHG | DIASTOLIC BLOOD PRESSURE: 72 MMHG | BODY MASS INDEX: 29.25 KG/M2 | HEIGHT: 66 IN | WEIGHT: 182 LBS | HEART RATE: 72 BPM

## 2024-03-15 DIAGNOSIS — M65.331 TRIGGER MIDDLE FINGER OF RIGHT HAND: Primary | ICD-10-CM

## 2024-03-15 PROCEDURE — 1036F TOBACCO NON-USER: CPT | Performed by: FAMILY MEDICINE

## 2024-03-15 PROCEDURE — 3008F BODY MASS INDEX DOCD: CPT | Performed by: FAMILY MEDICINE

## 2024-03-15 PROCEDURE — 1126F AMNT PAIN NOTED NONE PRSNT: CPT | Performed by: FAMILY MEDICINE

## 2024-03-15 PROCEDURE — 3078F DIAST BP <80 MM HG: CPT | Performed by: FAMILY MEDICINE

## 2024-03-15 PROCEDURE — 3075F SYST BP GE 130 - 139MM HG: CPT | Performed by: FAMILY MEDICINE

## 2024-03-15 PROCEDURE — 99213 OFFICE O/P EST LOW 20 MIN: CPT | Performed by: FAMILY MEDICINE

## 2024-03-15 RX ORDER — METHYLPREDNISOLONE ACETATE 80 MG/ML
20 INJECTION, SUSPENSION INTRA-ARTICULAR; INTRALESIONAL; INTRAMUSCULAR; SOFT TISSUE ONCE
Status: COMPLETED | OUTPATIENT
Start: 2024-03-15 | End: 2024-03-15

## 2024-03-15 RX ORDER — LIDOCAINE HYDROCHLORIDE 10 MG/ML
0.5 INJECTION INFILTRATION; PERINEURAL ONCE
Status: COMPLETED | OUTPATIENT
Start: 2024-03-15 | End: 2024-03-15

## 2024-03-15 RX ADMIN — LIDOCAINE HYDROCHLORIDE 5 MG: 10 INJECTION INFILTRATION; PERINEURAL at 13:59

## 2024-03-15 RX ADMIN — METHYLPREDNISOLONE ACETATE 20 MG: 80 INJECTION, SUSPENSION INTRA-ARTICULAR; INTRALESIONAL; INTRAMUSCULAR; SOFT TISSUE at 13:59

## 2024-03-15 ASSESSMENT — PAIN SCALES - GENERAL: PAINLEVEL: 0-NO PAIN

## 2024-03-15 ASSESSMENT — ENCOUNTER SYMPTOMS
DEPRESSION: 0
OCCASIONAL FEELINGS OF UNSTEADINESS: 0

## 2024-03-15 NOTE — PROGRESS NOTES
"Subjective   Patient ID: Adri Muir is a 68 y.o. female who presents for misc (C/o right middle finger problems [id number]).    HPI     Review of Systems   Musculoskeletal:         Right trigger finger third digit       Objective   /72 (BP Location: Left arm)   Pulse 72   Temp 36.5 °C (97.7 °F) (Temporal)   Ht 1.676 m (5' 6\")   Wt 82.6 kg (182 lb)   SpO2 97%   BMI 29.38 kg/m²     Physical Exam  Vitals and nursing note reviewed.   Constitutional:       Appearance: Normal appearance.   Musculoskeletal:      Comments: Right third finger trigger finger   Neurological:      Mental Status: She is alert.         Assessment/Plan patient seen here with a trigger finger we gave her a intra-articular injection and she will let me know if this is not resolved  Problem List Items Addressed This Visit    None  Visit Diagnoses         Codes    Trigger middle finger of right hand    -  Primary M65.331    Relevant Medications    methylPREDNISolone acetate (DEPO-Medrol) injection 20 mg (Start on 3/15/2024  1:30 PM)    dexAMETHasone (Decadron) injection 2 mg (Start on 3/15/2024  1:30 PM)    lidocaine (Xylocaine) 10 mg/mL (1 %) injection 5 mg (Start on 3/15/2024  1:30 PM)               "

## 2024-05-12 DIAGNOSIS — K21.9 GASTROESOPHAGEAL REFLUX DISEASE WITHOUT ESOPHAGITIS: ICD-10-CM

## 2024-05-14 RX ORDER — PANTOPRAZOLE SODIUM 40 MG/1
40 TABLET, DELAYED RELEASE ORAL DAILY
Qty: 30 TABLET | Refills: 2 | Status: SHIPPED | OUTPATIENT
Start: 2024-05-14

## 2024-08-21 PROBLEM — U07.1 COVID-19: Status: ACTIVE | Noted: 2023-11-16

## 2024-08-21 PROBLEM — H65.03 BILATERAL ACUTE SEROUS OTITIS MEDIA: Status: ACTIVE | Noted: 2023-11-16

## 2024-08-26 ENCOUNTER — HOSPITAL ENCOUNTER (OUTPATIENT)
Dept: RADIOLOGY | Facility: CLINIC | Age: 69
Discharge: HOME | End: 2024-08-26
Payer: MEDICARE

## 2024-08-26 ENCOUNTER — APPOINTMENT (OUTPATIENT)
Dept: PRIMARY CARE | Facility: CLINIC | Age: 69
End: 2024-08-26
Payer: MEDICARE

## 2024-08-26 VITALS
SYSTOLIC BLOOD PRESSURE: 124 MMHG | DIASTOLIC BLOOD PRESSURE: 80 MMHG | BODY MASS INDEX: 28.93 KG/M2 | HEIGHT: 66 IN | WEIGHT: 180 LBS

## 2024-08-26 DIAGNOSIS — E03.9 HYPOTHYROIDISM, UNSPECIFIED TYPE: ICD-10-CM

## 2024-08-26 DIAGNOSIS — M65.331 TRIGGER MIDDLE FINGER OF RIGHT HAND: Primary | ICD-10-CM

## 2024-08-26 DIAGNOSIS — M16.12 PRIMARY OSTEOARTHRITIS OF LEFT HIP: ICD-10-CM

## 2024-08-26 PROCEDURE — 73502 X-RAY EXAM HIP UNI 2-3 VIEWS: CPT | Mod: LEFT SIDE | Performed by: RADIOLOGY

## 2024-08-26 PROCEDURE — 3008F BODY MASS INDEX DOCD: CPT | Performed by: FAMILY MEDICINE

## 2024-08-26 PROCEDURE — 99213 OFFICE O/P EST LOW 20 MIN: CPT | Performed by: FAMILY MEDICINE

## 2024-08-26 PROCEDURE — 1125F AMNT PAIN NOTED PAIN PRSNT: CPT | Performed by: FAMILY MEDICINE

## 2024-08-26 PROCEDURE — 3079F DIAST BP 80-89 MM HG: CPT | Performed by: FAMILY MEDICINE

## 2024-08-26 PROCEDURE — 73502 X-RAY EXAM HIP UNI 2-3 VIEWS: CPT | Mod: LT

## 2024-08-26 PROCEDURE — 3074F SYST BP LT 130 MM HG: CPT | Performed by: FAMILY MEDICINE

## 2024-08-26 PROCEDURE — 1036F TOBACCO NON-USER: CPT | Performed by: FAMILY MEDICINE

## 2024-08-26 PROCEDURE — 1160F RVW MEDS BY RX/DR IN RCRD: CPT | Performed by: FAMILY MEDICINE

## 2024-08-26 PROCEDURE — 1159F MED LIST DOCD IN RCRD: CPT | Performed by: FAMILY MEDICINE

## 2024-08-26 RX ORDER — LIDOCAINE HYDROCHLORIDE 10 MG/ML
0.25 INJECTION INFILTRATION; PERINEURAL ONCE
Status: COMPLETED | OUTPATIENT
Start: 2024-08-26 | End: 2024-08-26

## 2024-08-26 RX ORDER — METHYLPREDNISOLONE ACETATE 80 MG/ML
20 INJECTION, SUSPENSION INTRA-ARTICULAR; INTRALESIONAL; INTRAMUSCULAR; SOFT TISSUE ONCE
Status: COMPLETED | OUTPATIENT
Start: 2024-08-26 | End: 2024-08-26

## 2024-08-26 ASSESSMENT — ENCOUNTER SYMPTOMS
ARTHRALGIAS: 1
DEPRESSION: 0
LOSS OF SENSATION IN FEET: 0
OCCASIONAL FEELINGS OF UNSTEADINESS: 0

## 2024-08-26 ASSESSMENT — PAIN SCALES - GENERAL: PAINLEVEL: 3

## 2024-08-26 NOTE — PROGRESS NOTES
"Subjective   Patient ID: Adri Muir is a 69 y.o. female who presents for c/o (Right middle finger problems x 1month/Left groin pain x 6 months).    HPI     Review of Systems   Musculoskeletal:  Positive for arthralgias.       Objective   /80 (BP Location: Left arm)   Ht 1.676 m (5' 6\")   Wt 81.6 kg (180 lb)   BMI 29.05 kg/m²     Physical Exam  Vitals and nursing note reviewed.   Constitutional:       Appearance: Normal appearance.   Musculoskeletal:      Comments: DJD of the left hip trigger finger third finger right hand   Neurological:      Mental Status: She is alert.     Patient ID: Adri Muir is a 69 y.o. female.    Procedures after sterile preparation the third digit of the right hand was injected with Decadron Depo-Medrol and 1% lidocaine    Assessment/Plan patient seen here for a intra-articular injection in her third finger of her right hand we are also getting x-ray of her left hip.  Will see her back as needed  Problem List Items Addressed This Visit             ICD-10-CM    Hypothyroidism E03.9     Other Visit Diagnoses         Codes    Trigger middle finger of right hand    -  Primary M65.331    Relevant Medications    methylPREDNISolone acetate (DEPO-Medrol) injection 20 mg (Start on 8/26/2024 12:45 PM)    dexAMETHasone (Decadron) injection 2 mg (Start on 8/26/2024 12:45 PM)    lidocaine (Xylocaine) 10 mg/mL (1 %) injection 0.25 mL (Start on 8/26/2024 12:45 PM)    Primary osteoarthritis of left hip     M16.12    Relevant Orders    XR hip left with pelvis when performed 2 or 3 views               "

## 2024-10-11 ENCOUNTER — TELEPHONE (OUTPATIENT)
Dept: PRIMARY CARE | Facility: CLINIC | Age: 69
End: 2024-10-11
Payer: MEDICARE

## 2024-10-11 DIAGNOSIS — M70.51 BURSITIS OF RIGHT KNEE, UNSPECIFIED BURSA: Primary | ICD-10-CM

## 2024-11-04 ENCOUNTER — HOSPITAL ENCOUNTER (OUTPATIENT)
Dept: RADIOLOGY | Facility: HOSPITAL | Age: 69
Discharge: HOME | End: 2024-11-04
Payer: MEDICARE

## 2024-11-04 DIAGNOSIS — M70.51 BURSITIS OF RIGHT KNEE, UNSPECIFIED BURSA: ICD-10-CM

## 2024-11-04 PROCEDURE — 73562 X-RAY EXAM OF KNEE 3: CPT | Mod: RIGHT SIDE | Performed by: RADIOLOGY

## 2024-11-04 PROCEDURE — 73562 X-RAY EXAM OF KNEE 3: CPT | Mod: RT

## 2024-11-21 ENCOUNTER — APPOINTMENT (OUTPATIENT)
Dept: PRIMARY CARE | Facility: CLINIC | Age: 69
End: 2024-11-21
Payer: MEDICARE

## 2024-11-21 PROBLEM — N39.3 STRESS INCONTINENCE IN FEMALE: Status: ACTIVE | Noted: 2024-11-21

## 2024-11-21 PROBLEM — J20.9 ACUTE BRONCHITIS: Status: ACTIVE | Noted: 2024-11-21

## 2024-11-21 PROBLEM — L03.90 CELLULITIS: Status: ACTIVE | Noted: 2024-11-21

## 2024-11-21 PROBLEM — Z86.59 HISTORY OF DEPRESSION: Status: ACTIVE | Noted: 2024-11-21

## 2024-11-21 PROBLEM — E66.9 OBESITY WITH BODY MASS INDEX 30 OR GREATER: Status: ACTIVE | Noted: 2024-11-21

## 2024-11-22 ENCOUNTER — LAB (OUTPATIENT)
Dept: LAB | Facility: LAB | Age: 69
End: 2024-11-22
Payer: MEDICARE

## 2024-11-22 ENCOUNTER — OFFICE VISIT (OUTPATIENT)
Dept: PRIMARY CARE | Facility: CLINIC | Age: 69
End: 2024-11-22
Payer: MEDICARE

## 2024-11-22 VITALS
SYSTOLIC BLOOD PRESSURE: 120 MMHG | BODY MASS INDEX: 28.8 KG/M2 | WEIGHT: 179.2 LBS | OXYGEN SATURATION: 97 % | HEART RATE: 77 BPM | DIASTOLIC BLOOD PRESSURE: 80 MMHG | TEMPERATURE: 97.5 F | HEIGHT: 66 IN

## 2024-11-22 DIAGNOSIS — Z12.31 SCREENING MAMMOGRAM FOR BREAST CANCER: ICD-10-CM

## 2024-11-22 DIAGNOSIS — J30.1 SEASONAL ALLERGIC RHINITIS DUE TO POLLEN: ICD-10-CM

## 2024-11-22 DIAGNOSIS — E78.5 BORDERLINE HYPERLIPIDEMIA: ICD-10-CM

## 2024-11-22 DIAGNOSIS — K22.70 BARRETT'S ESOPHAGUS WITHOUT DYSPLASIA: ICD-10-CM

## 2024-11-22 DIAGNOSIS — E03.9 HYPOTHYROIDISM, UNSPECIFIED TYPE: ICD-10-CM

## 2024-11-22 DIAGNOSIS — Z00.00 MEDICARE ANNUAL WELLNESS VISIT, SUBSEQUENT: Primary | ICD-10-CM

## 2024-11-22 DIAGNOSIS — I15.9 BENIGN SECONDARY HYPERTENSION: ICD-10-CM

## 2024-11-22 LAB
ALBUMIN SERPL BCP-MCNC: 4.5 G/DL (ref 3.4–5)
ALP SERPL-CCNC: 54 U/L (ref 33–136)
ALT SERPL W P-5'-P-CCNC: 14 U/L (ref 7–45)
ANION GAP SERPL CALC-SCNC: 13 MMOL/L (ref 10–20)
AST SERPL W P-5'-P-CCNC: 17 U/L (ref 9–39)
BASOPHILS # BLD AUTO: 0.05 X10*3/UL (ref 0–0.1)
BASOPHILS NFR BLD AUTO: 1 %
BILIRUB SERPL-MCNC: 0.7 MG/DL (ref 0–1.2)
BUN SERPL-MCNC: 27 MG/DL (ref 6–23)
CALCIUM SERPL-MCNC: 9.5 MG/DL (ref 8.6–10.6)
CHLORIDE SERPL-SCNC: 105 MMOL/L (ref 98–107)
CHOLEST SERPL-MCNC: 225 MG/DL (ref 0–199)
CHOLESTEROL/HDL RATIO: 2.7
CO2 SERPL-SCNC: 27 MMOL/L (ref 21–32)
CREAT SERPL-MCNC: 0.78 MG/DL (ref 0.5–1.05)
EGFRCR SERPLBLD CKD-EPI 2021: 81 ML/MIN/1.73M*2
EOSINOPHIL # BLD AUTO: 0.15 X10*3/UL (ref 0–0.4)
EOSINOPHIL NFR BLD AUTO: 3.1 %
ERYTHROCYTE [DISTWIDTH] IN BLOOD BY AUTOMATED COUNT: 14.2 % (ref 11.5–14.5)
GLUCOSE SERPL-MCNC: 79 MG/DL (ref 74–99)
HCT VFR BLD AUTO: 44.2 % (ref 36–46)
HDLC SERPL-MCNC: 82.2 MG/DL
HGB BLD-MCNC: 14.4 G/DL (ref 12–16)
IMM GRANULOCYTES # BLD AUTO: 0.01 X10*3/UL (ref 0–0.5)
IMM GRANULOCYTES NFR BLD AUTO: 0.2 % (ref 0–0.9)
LDLC SERPL CALC-MCNC: 127 MG/DL
LYMPHOCYTES # BLD AUTO: 1.52 X10*3/UL (ref 0.8–3)
LYMPHOCYTES NFR BLD AUTO: 31.4 %
MCH RBC QN AUTO: 30.2 PG (ref 26–34)
MCHC RBC AUTO-ENTMCNC: 32.6 G/DL (ref 32–36)
MCV RBC AUTO: 93 FL (ref 80–100)
MONOCYTES # BLD AUTO: 0.4 X10*3/UL (ref 0.05–0.8)
MONOCYTES NFR BLD AUTO: 8.3 %
NEUTROPHILS # BLD AUTO: 2.71 X10*3/UL (ref 1.6–5.5)
NEUTROPHILS NFR BLD AUTO: 56 %
NON HDL CHOLESTEROL: 143 MG/DL (ref 0–149)
NRBC BLD-RTO: 0 /100 WBCS (ref 0–0)
PLATELET # BLD AUTO: 260 X10*3/UL (ref 150–450)
POTASSIUM SERPL-SCNC: 4.7 MMOL/L (ref 3.5–5.3)
PROT SERPL-MCNC: 6.3 G/DL (ref 6.4–8.2)
RBC # BLD AUTO: 4.77 X10*6/UL (ref 4–5.2)
SODIUM SERPL-SCNC: 140 MMOL/L (ref 136–145)
TRIGL SERPL-MCNC: 78 MG/DL (ref 0–149)
TSH SERPL-ACNC: 1 MIU/L (ref 0.44–3.98)
VLDL: 16 MG/DL (ref 0–40)
WBC # BLD AUTO: 4.8 X10*3/UL (ref 4.4–11.3)

## 2024-11-22 PROCEDURE — 1124F ACP DISCUSS-NO DSCNMKR DOCD: CPT | Performed by: FAMILY MEDICINE

## 2024-11-22 PROCEDURE — 99397 PER PM REEVAL EST PAT 65+ YR: CPT | Performed by: FAMILY MEDICINE

## 2024-11-22 PROCEDURE — 1126F AMNT PAIN NOTED NONE PRSNT: CPT | Performed by: FAMILY MEDICINE

## 2024-11-22 PROCEDURE — 80061 LIPID PANEL: CPT

## 2024-11-22 PROCEDURE — 3008F BODY MASS INDEX DOCD: CPT | Performed by: FAMILY MEDICINE

## 2024-11-22 PROCEDURE — 80053 COMPREHEN METABOLIC PANEL: CPT

## 2024-11-22 PROCEDURE — 3074F SYST BP LT 130 MM HG: CPT | Performed by: FAMILY MEDICINE

## 2024-11-22 PROCEDURE — 1170F FXNL STATUS ASSESSED: CPT | Performed by: FAMILY MEDICINE

## 2024-11-22 PROCEDURE — 84443 ASSAY THYROID STIM HORMONE: CPT

## 2024-11-22 PROCEDURE — 90662 IIV NO PRSV INCREASED AG IM: CPT | Performed by: FAMILY MEDICINE

## 2024-11-22 PROCEDURE — G0008 ADMIN INFLUENZA VIRUS VAC: HCPCS | Performed by: FAMILY MEDICINE

## 2024-11-22 PROCEDURE — 1036F TOBACCO NON-USER: CPT | Performed by: FAMILY MEDICINE

## 2024-11-22 PROCEDURE — 1159F MED LIST DOCD IN RCRD: CPT | Performed by: FAMILY MEDICINE

## 2024-11-22 PROCEDURE — 1160F RVW MEDS BY RX/DR IN RCRD: CPT | Performed by: FAMILY MEDICINE

## 2024-11-22 PROCEDURE — 99497 ADVNCD CARE PLAN 30 MIN: CPT | Performed by: FAMILY MEDICINE

## 2024-11-22 PROCEDURE — 85025 COMPLETE CBC W/AUTO DIFF WBC: CPT

## 2024-11-22 PROCEDURE — 36415 COLL VENOUS BLD VENIPUNCTURE: CPT

## 2024-11-22 PROCEDURE — 3079F DIAST BP 80-89 MM HG: CPT | Performed by: FAMILY MEDICINE

## 2024-11-22 ASSESSMENT — ACTIVITIES OF DAILY LIVING (ADL)
JUDGMENT_ADEQUATE_SAFELY_COMPLETE_DAILY_ACTIVITIES: YES
GROCERY SHOPPING: INDEPENDENT
GROOMING: INDEPENDENT
ADEQUATE_TO_COMPLETE_ADL: YES
BATHING: INDEPENDENT
TAKING MEDICATION: INDEPENDENT
USING TELEPHONE: INDEPENDENT
DOING HOUSEWORK: INDEPENDENT
NEEDS ASSISTANCE WITH FOOD: INDEPENDENT
TOILETING: INDEPENDENT
PREPARING MEALS: INDEPENDENT
PATIENT'S MEMORY ADEQUATE TO SAFELY COMPLETE DAILY ACTIVITIES?: YES
WALKS IN HOME: INDEPENDENT
STIL DRIVING: YES
EATING: INDEPENDENT
FEEDING YOURSELF: INDEPENDENT
MANAGING FINANCES: INDEPENDENT
USING TRANSPORTATION: INDEPENDENT

## 2024-11-22 ASSESSMENT — ANXIETY QUESTIONNAIRES
1. FEELING NERVOUS, ANXIOUS, OR ON EDGE: NOT AT ALL
6. BECOMING EASILY ANNOYED OR IRRITABLE: NOT AT ALL
7. FEELING AFRAID AS IF SOMETHING AWFUL MIGHT HAPPEN: NOT AT ALL
GAD7 TOTAL SCORE: 0
4. TROUBLE RELAXING: NOT AT ALL
3. WORRYING TOO MUCH ABOUT DIFFERENT THINGS: NOT AT ALL
2. NOT BEING ABLE TO STOP OR CONTROL WORRYING: NOT AT ALL
5. BEING SO RESTLESS THAT IT IS HARD TO SIT STILL: NOT AT ALL

## 2024-11-22 ASSESSMENT — GERIATRIC MINI NUTRITIONAL ASSESSMENT (MNA)
C GENERAL MOBILITY: GOES OUT
A HAS FOOD INTAKE DECLINED OVER THE PAST 3 MONTHS DUE TO LOSS OF APPETITE, DIGESTIVE PROBLEMS, CHEWING OR SWALLOWING DIFFICULTIES?: NO DECREASE IN FOOD INTAKE
D HAS SUFFERED PSYCHOLOGICAL STRESS OR ACUTE DISEASE IN THE PAST 3 MONTHS?: NO
E NEUROPSYCHOLOGICAL PROBLEMS: NO PSYCHOLOGICAL PROBLEMS
B WEIGHT LOSS DURING THE LAST 3 MONTHS: NO WEIGHT LOSS

## 2024-11-22 ASSESSMENT — PAIN SCALES - GENERAL: PAINLEVEL_OUTOF10: 0-NO PAIN

## 2024-11-22 ASSESSMENT — ENCOUNTER SYMPTOMS
RESPIRATORY NEGATIVE: 1
CONSTITUTIONAL NEGATIVE: 1
PSYCHIATRIC NEGATIVE: 1
ENDOCRINE NEGATIVE: 1
NEUROLOGICAL NEGATIVE: 1
ARTHRALGIAS: 1
LOSS OF SENSATION IN FEET: 0
OCCASIONAL FEELINGS OF UNSTEADINESS: 0
CARDIOVASCULAR NEGATIVE: 1
DEPRESSION: 0
GASTROINTESTINAL NEGATIVE: 1

## 2024-11-22 NOTE — PROGRESS NOTES
"Subjective   Patient ID: Adri Muir is a 69 y.o. female who presents for Annual Exam (Annual medicare wellness fasting bw).    HPI     Review of Systems   Constitutional: Negative.    HENT: Negative.     Respiratory: Negative.     Cardiovascular: Negative.    Gastrointestinal: Negative.         Dr Bueno   Endocrine: Negative.    Genitourinary: Negative.    Musculoskeletal:  Positive for arthralgias.   Neurological: Negative.    Psychiatric/Behavioral: Negative.         Objective   /80 (BP Location: Left arm)   Pulse 77   Temp 36.4 °C (97.5 °F) (Temporal)   Ht 1.676 m (5' 6\")   Wt 81.3 kg (179 lb 3.2 oz)   SpO2 97%   BMI 28.92 kg/m²     Physical Exam  Vitals and nursing note reviewed.   Constitutional:       Appearance: Normal appearance.   HENT:      Right Ear: Tympanic membrane normal.      Left Ear: Tympanic membrane normal.      Mouth/Throat:      Pharynx: Oropharynx is clear.   Cardiovascular:      Rate and Rhythm: Normal rate and regular rhythm.      Pulses: Normal pulses.      Heart sounds: Normal heart sounds.   Pulmonary:      Breath sounds: Normal breath sounds.   Abdominal:      Palpations: Abdomen is soft.   Musculoskeletal:         General: Normal range of motion.   Neurological:      General: No focal deficit present.      Mental Status: She is alert and oriented to person, place, and time.   Psychiatric:         Mood and Affect: Mood normal.         Behavior: Behavior normal.         Assessment/Plan patient seen here for an annual Medicare wellness exam.  Reviewed her questionnaire she is agreeable to her responses.  We did discuss advanced directives.  She has no significant difficulty with depression or anxiety.  We are drawing her lab work here today she is getting her flu vaccine and she has a requisition for mammography.  Will see her back in a year  Problem List Items Addressed This Visit             ICD-10-CM    Allergic rhinitis due to pollen J30.1    Dove's esophagus " K22.70    Benign secondary hypertension I15.9    Relevant Orders    CBC and Auto Differential    Comprehensive Metabolic Panel    Borderline hyperlipidemia E78.5    Relevant Orders    Lipid Panel    Hypothyroidism E03.9    Relevant Orders    TSH with reflex to Free T4 if abnormal     Other Visit Diagnoses         Codes    Medicare annual wellness visit, subsequent    -  Primary Z00.00    BMI 28.0-28.9,adult     Z68.28    Screening mammogram for breast cancer     Z12.31    Relevant Orders    BI mammo bilateral screening tomosynthesis

## 2024-11-22 NOTE — ACP (ADVANCE CARE PLANNING)
Confirming Previous Code Status:   Advance Care Planning Note     Discussion Date: 11/22/24   Discussion Participants: patient    The patient wishes to discuss Advance Care Planning today and the following is a brief summary of our discussion.     Patient has capacity to make their own medical decisions: Yes  Health Care Agent/Surrogate Decision Maker documented in chart: Yes    Documents on file and valid:  Advance Directive/Living Will: No   Health Care Power of : No  Other: none    Communication of Medical Status/Prognosis:   yes     Communication of Treatment Goals/Options:   yes     Treatment Decisions  Goals of Care: survival is paramount regardless of prognosis, treatment outcome, or burden   yes  Follow Up Plan  no  Team Members  myself  Time Statement: Total face to face time spent on advance care planning was 16 minutes with 16 minutes spent in counseling, including the explanation.    Regan Juarez,   11/22/2024 9:30 AM

## 2025-01-21 ENCOUNTER — APPOINTMENT (OUTPATIENT)
Dept: PRIMARY CARE | Facility: CLINIC | Age: 70
End: 2025-01-21
Payer: MEDICARE

## 2025-02-06 ENCOUNTER — APPOINTMENT (OUTPATIENT)
Dept: PRIMARY CARE | Facility: CLINIC | Age: 70
End: 2025-02-06
Payer: MEDICARE

## 2025-02-06 VITALS
HEART RATE: 77 BPM | WEIGHT: 176.8 LBS | TEMPERATURE: 97.6 F | DIASTOLIC BLOOD PRESSURE: 74 MMHG | SYSTOLIC BLOOD PRESSURE: 122 MMHG | HEIGHT: 66 IN | OXYGEN SATURATION: 96 % | BODY MASS INDEX: 28.42 KG/M2

## 2025-02-06 DIAGNOSIS — I15.9 BENIGN SECONDARY HYPERTENSION: ICD-10-CM

## 2025-02-06 DIAGNOSIS — M75.52 BURSITIS OF LEFT SHOULDER: Primary | ICD-10-CM

## 2025-02-06 DIAGNOSIS — M65.331 TRIGGER MIDDLE FINGER OF RIGHT HAND: ICD-10-CM

## 2025-02-06 DIAGNOSIS — E03.9 HYPOTHYROIDISM, UNSPECIFIED TYPE: ICD-10-CM

## 2025-02-06 DIAGNOSIS — E78.5 BORDERLINE HYPERLIPIDEMIA: ICD-10-CM

## 2025-02-06 PROCEDURE — 99214 OFFICE O/P EST MOD 30 MIN: CPT | Performed by: FAMILY MEDICINE

## 2025-02-06 PROCEDURE — 1160F RVW MEDS BY RX/DR IN RCRD: CPT | Performed by: FAMILY MEDICINE

## 2025-02-06 PROCEDURE — 1159F MED LIST DOCD IN RCRD: CPT | Performed by: FAMILY MEDICINE

## 2025-02-06 PROCEDURE — 3074F SYST BP LT 130 MM HG: CPT | Performed by: FAMILY MEDICINE

## 2025-02-06 PROCEDURE — 3078F DIAST BP <80 MM HG: CPT | Performed by: FAMILY MEDICINE

## 2025-02-06 PROCEDURE — G2211 COMPLEX E/M VISIT ADD ON: HCPCS | Performed by: FAMILY MEDICINE

## 2025-02-06 PROCEDURE — 1125F AMNT PAIN NOTED PAIN PRSNT: CPT | Performed by: FAMILY MEDICINE

## 2025-02-06 PROCEDURE — 1036F TOBACCO NON-USER: CPT | Performed by: FAMILY MEDICINE

## 2025-02-06 PROCEDURE — 3008F BODY MASS INDEX DOCD: CPT | Performed by: FAMILY MEDICINE

## 2025-02-06 RX ORDER — LIDOCAINE HYDROCHLORIDE 10 MG/ML
1 INJECTION, SOLUTION INFILTRATION; PERINEURAL ONCE
Status: COMPLETED | OUTPATIENT
Start: 2025-02-06 | End: 2025-02-06

## 2025-02-06 RX ORDER — METHYLPREDNISOLONE ACETATE 80 MG/ML
80 INJECTION, SUSPENSION INTRA-ARTICULAR; INTRALESIONAL; INTRAMUSCULAR; SOFT TISSUE ONCE
Status: COMPLETED | OUTPATIENT
Start: 2025-02-06 | End: 2025-02-06

## 2025-02-06 RX ADMIN — LIDOCAINE HYDROCHLORIDE 1 ML: 10 INJECTION, SOLUTION INFILTRATION; PERINEURAL at 16:50

## 2025-02-06 RX ADMIN — METHYLPREDNISOLONE ACETATE 80 MG: 80 INJECTION, SUSPENSION INTRA-ARTICULAR; INTRALESIONAL; INTRAMUSCULAR; SOFT TISSUE at 16:51

## 2025-02-06 ASSESSMENT — PAIN SCALES - GENERAL: PAINLEVEL_OUTOF10: 10-WORST PAIN EVER

## 2025-02-06 ASSESSMENT — ENCOUNTER SYMPTOMS
LOSS OF SENSATION IN FEET: 0
OCCASIONAL FEELINGS OF UNSTEADINESS: 0
DEPRESSION: 0
CONSTITUTIONAL NEGATIVE: 1
ARTHRALGIAS: 1

## 2025-02-06 ASSESSMENT — PATIENT HEALTH QUESTIONNAIRE - PHQ9
2. FEELING DOWN, DEPRESSED OR HOPELESS: NOT AT ALL
1. LITTLE INTEREST OR PLEASURE IN DOING THINGS: NOT AT ALL
SUM OF ALL RESPONSES TO PHQ9 QUESTIONS 1 AND 2: 0

## 2025-02-06 NOTE — PROGRESS NOTES
"Subjective   Patient ID: Adri Muir is a 69 y.o. female who presents for c/o (Left shoulder pain x 2 months).    HPI     Review of Systems   Constitutional: Negative.    Musculoskeletal:  Positive for arthralgias.        Pain left shoulder DJD by xray SW       Objective   /74 (BP Location: Left arm)   Pulse 77   Temp 36.4 °C (97.6 °F) (Temporal)   Ht 1.676 m (5' 6\")   Wt 80.2 kg (176 lb 12.8 oz)   SpO2 96%   BMI 28.54 kg/m²     Physical Exam  Vitals and nursing note reviewed.   Constitutional:       Appearance: Normal appearance.   Cardiovascular:      Rate and Rhythm: Regular rhythm.      Heart sounds: Normal heart sounds.   Musculoskeletal:      Comments: Pain left shoulder limited range of motion especially with elevation some rotator cuff weakness neurovascular is intact.  She also has a trigger finger third right hand   Neurological:      Mental Status: She is alert.         Assessment/Plan patient seen here for some bursitis in her left shoulder we are sending her for physical therapy we gave her an intra-articular injection today.  We are also sending her to hand surgery for trigger finger on her right hand.Patient ID: Adri Muir is a 69 y.o. female.    Procedures after sterile preparation the left shoulder was injected with Decadron Depo-Medrol and 1% lidocaine  Problem List Items Addressed This Visit             ICD-10-CM    Benign secondary hypertension I15.9    Borderline hyperlipidemia E78.5    Bursitis of left shoulder - Primary M75.52    Relevant Medications    methylPREDNISolone acetate (DEPO-Medrol) injection 80 mg (Start on 2/6/2025  4:30 PM)    dexAMETHasone (Decadron) injection 4 mg (Start on 2/6/2025  4:30 PM)    lidocaine (Xylocaine) 10 mg/mL (1 %) injection 1 mL (Start on 2/6/2025  4:30 PM)    Other Relevant Orders    Referral to Physical Therapy    Hypothyroidism E03.9     Other Visit Diagnoses         Codes    Trigger middle finger of right hand     M65.331    Relevant " Orders    Referral to Orthopaedic Surgery

## 2025-02-07 ENCOUNTER — HOSPITAL ENCOUNTER (OUTPATIENT)
Dept: RADIOLOGY | Facility: CLINIC | Age: 70
Discharge: HOME | End: 2025-02-07
Payer: MEDICARE

## 2025-02-07 VITALS — HEIGHT: 66 IN | WEIGHT: 176.8 LBS | BODY MASS INDEX: 28.42 KG/M2

## 2025-02-07 DIAGNOSIS — Z12.31 SCREENING MAMMOGRAM FOR BREAST CANCER: ICD-10-CM

## 2025-02-07 PROCEDURE — 77067 SCR MAMMO BI INCL CAD: CPT

## 2025-02-07 PROCEDURE — 77067 SCR MAMMO BI INCL CAD: CPT | Performed by: RADIOLOGY

## 2025-02-07 PROCEDURE — 77063 BREAST TOMOSYNTHESIS BI: CPT | Performed by: RADIOLOGY

## 2025-02-24 ENCOUNTER — APPOINTMENT (OUTPATIENT)
Dept: SURGERY | Facility: CLINIC | Age: 70
End: 2025-02-24
Payer: MEDICARE

## 2025-02-24 VITALS
DIASTOLIC BLOOD PRESSURE: 75 MMHG | WEIGHT: 174.6 LBS | TEMPERATURE: 97.8 F | HEART RATE: 88 BPM | RESPIRATION RATE: 16 BRPM | OXYGEN SATURATION: 96 % | HEIGHT: 66 IN | SYSTOLIC BLOOD PRESSURE: 120 MMHG | BODY MASS INDEX: 28.06 KG/M2

## 2025-02-24 DIAGNOSIS — K82.4 GALLBLADDER POLYP: Primary | ICD-10-CM

## 2025-02-24 PROCEDURE — 1036F TOBACCO NON-USER: CPT | Performed by: SURGERY

## 2025-02-24 PROCEDURE — 3078F DIAST BP <80 MM HG: CPT | Performed by: SURGERY

## 2025-02-24 PROCEDURE — 3074F SYST BP LT 130 MM HG: CPT | Performed by: SURGERY

## 2025-02-24 PROCEDURE — 1159F MED LIST DOCD IN RCRD: CPT | Performed by: SURGERY

## 2025-02-24 PROCEDURE — 3008F BODY MASS INDEX DOCD: CPT | Performed by: SURGERY

## 2025-02-24 PROCEDURE — 1126F AMNT PAIN NOTED NONE PRSNT: CPT | Performed by: SURGERY

## 2025-02-24 PROCEDURE — 99203 OFFICE O/P NEW LOW 30 MIN: CPT | Performed by: SURGERY

## 2025-02-24 ASSESSMENT — PAIN SCALES - GENERAL: PAINLEVEL_OUTOF10: 0-NO PAIN

## 2025-02-24 NOTE — LETTER
February 24, 2025     Regan Juarez DO  [address]  [facility] [address]    Patient: Adri Muir   YOB: 1955   Date of Visit: 2/24/2025       Dear Dr. Regan Juarez DO:    Thank you for referring Adri Muir to me for evaluation. Below are my notes for this consultation.  If you have questions, please do not hesitate to call me. I look forward to following your patient along with you.       Sincerely,     Soto Niño MD      CC: Ward Bueno MD  ______________________________________________________________________________________    History Of Present Illness  HPI   The patient is a 69-year-old female referred for gallbladder polyp.  She reports having the polyp for about 10 years, but it was found to be larger on recent ultrasound.  She has a good appetite without vomiting.  No history of jaundice.  She has chronic tenderness in her right upper quadrant and chronic nausea.    Past medical history:  Hypertension  Left shoulder bursitis  Hypothyroid  Dove's esophagus.  Hiatal hernia repair with Nissen fundoplication  Craniotomy with decompression of 7th cranial nerve.  Right shoulder rotator cuff operation  Bilateral cataract extraction    Allergies: Amoxicillin caused her face to turn red and rapid breathing.  She did not require hospitalization.    Past Medical History  She has a past medical history of Cellulitis, unspecified, Disease of intestine, unspecified, Diverticulitis of intestine, part unspecified, without perforation or abscess without bleeding, Personal history of other diseases of the digestive system (05/29/2019), Personal history of other diseases of the digestive system (05/29/2019), Personal history of other diseases of the musculoskeletal system and connective tissue, Personal history of other diseases of the musculoskeletal system and connective tissue, and Personal history of other mental and behavioral  "disorders.    Surgical History  She has a past surgical history that includes Other surgical history (03/02/2016); Colonoscopy (03/02/2016); Tubal ligation (03/02/2016); Refractive surgery (03/02/2016); Tonsillectomy (03/02/2016); and Other surgical history (05/29/2019).     Allergies  Amoxicillin and Penicillins    Social History  She reports that she has never smoked. She has never used smokeless tobacco. She reports current alcohol use. She reports that she does not use drugs.    Family History  No family history on file.    Review of Systems  Review of Systems:  Constitutional:  no fever, no chills, no significant weight change  Neurological: As above.  No history of CVA or seizure disorder  Eyes: As above.  ENT:  No recent hearing loss  Neck: No pain  Cardiovascular: No chest pain, no history of cardiac disease such as myocardial infarction or arrhythmia or congestive heart failure  Pulmonary: No shortness of breath, no history of pulmonary disease such as pneumonia or COPD  Breast: No history of breast disease or breast mass  Gastrointestinal: As above.  No constipation or diarrhea or blood in the stool.  No history of ulcers.  No liver or pancreas disease.  No intestinal disorder.  Genitourinary: No hematuria or dysuria, no kidney disease  Musculoskeletal: As above.  Integumentary:  no rash  Psychiatric:  No anxiety or depression  Endocrine:  no history of diabetes  Hematologic/Lymphatic: No easy bruising or bleeding    Last Recorded Vitals  Blood pressure 120/75, pulse 88, temperature 36.6 °C (97.8 °F), temperature source Temporal, resp. rate 16, height 1.676 m (5' 6\"), weight 79.2 kg (174 lb 9.6 oz), SpO2 96%.    Physical Exam  Constitutional: Well-developed, well-nourished, alert and oriented, no acute distress  Skin: Warm and dry, no lesions, no rashes, no jaundice  HEENT: Normocephalic, atraumatic, EOMI, no scleral icterus, eyes have no redness or swelling or discharge, external inspection of ears and " nose is normal, mucous membranes moist  Neck: Soft, nontender, no mass or adenopathy  Cardiac: Regular rate and rhythm, no murmur  Chest: Patent airway, clear to auscultation, normal breath sounds with good chest expansion, no wheezes or rales or rhonchi noted, thorax symmetric  Abdomen: Nondistended, laparoscopy scars, positive bowel sounds, soft, nontender, no mass  Rectal: Not performed  Extremities: No injury, no lower extremity edema or calf tenderness  Lymphatic: No cervical adenopathy  Musculoskeletal: Range of motion intact, no joint swelling, normal strength  Neurological: Alert and oriented x3, intact sensory and motor function, no obvious focal neurologic abnormalities, normal gait  Psychological: Appropriate mood and behavior  Examination chaperoned by Estefany Webb    Relevant Results  Labs from November 22, 2024: WBC 4.8, hemoglobin 14.4, platelet 260  BUN 27, BMP otherwise normal.  Liver function tests normal    I reviewed the biliary ultrasound report from February 11, 2025.  Findings:   Liver:   The liver measures approximately 16.2 cm in length. Hepatic echogenicity is heterogeneous and increased. The differential includes fatty infiltration and hepatocellular disease. Hepatic elastography may provide additional information if clinically indicated.. . No definite discrete focal lesion is identified within the liver by ultrasound.   Bile ducts:   There is no dilatation of the intrahepatic biliary tree. The common bile duct measures 0.4 cm which is within the range of normal.   Gallbladder:   The gallbladder is free of mobile stones, sludge, wall thickening, pericholecystic fluid, or positive Prince sign. There is a 0.4 x 0.5 x 0.7 cm diameter nonmobile bile echogenic focus along the wall of the gallbladder compatible with a gallbladder wall polyp; previously 0.4 cm. There may be mild interval enlargement. Continued follow-up is recommended.   Pancreas:   No definite discrete focal lesion is identified  within the visualized pancreas by ultrasound..   Right kidney:   The right kidney measures 10.4 x 5.3 x 5.1 cm. Right renal cortical echogenicity and cortical medullary differentiation is maintained. The diameter of the right renal cortex measures 0.6 cm. There is no evidence of hydronephrosis. . No solid or cystic renal masses are identified. There is no right perinephric fluid.   Impression:   Heterogeneous and increased hepatic echogenicity. The differential includes fatty infiltration and hepatocellular disease.   0.4 x 0.5 x 0.7 cm diameter gallbladder wall polyp; previously 0.4 cm. There may be mild interval enlargement.   Otherwise, unremarkable right upper quadrant ultrasound.     Assessment/Plan  Diagnoses and all orders for this visit:  Gallbladder polyp    69-year-old female with a 10-year history of a gallbladder polyp which measured 0.7 cm diameter on recent ultrasound.  Reported to previously measure 0.4 cm diameter on January 25, 2023 ultrasound.  The radiologist feels there may be mild interval enlargement and recommends continued follow-up.  Follow-up in 1 year with repeat right upper quadrant ultrasound.    Soto Niño MD

## 2025-02-24 NOTE — PROGRESS NOTES
History Of Present Illness  HPI   The patient is a 69-year-old female referred for gallbladder polyp.  She reports having the polyp for about 10 years, but it was found to be larger on recent ultrasound.  She has a good appetite without vomiting.  No history of jaundice.  She has chronic tenderness in her right upper quadrant and chronic nausea.    Past medical history:  Hypertension  Left shoulder bursitis  Hypothyroid  Dove's esophagus.  Hiatal hernia repair with Nissen fundoplication  Craniotomy with decompression of 7th cranial nerve.  Right shoulder rotator cuff operation  Bilateral cataract extraction    Allergies: Amoxicillin caused her face to turn red and rapid breathing.  She did not require hospitalization.    Past Medical History  She has a past medical history of Cellulitis, unspecified, Disease of intestine, unspecified, Diverticulitis of intestine, part unspecified, without perforation or abscess without bleeding, Personal history of other diseases of the digestive system (05/29/2019), Personal history of other diseases of the digestive system (05/29/2019), Personal history of other diseases of the musculoskeletal system and connective tissue, Personal history of other diseases of the musculoskeletal system and connective tissue, and Personal history of other mental and behavioral disorders.    Surgical History  She has a past surgical history that includes Other surgical history (03/02/2016); Colonoscopy (03/02/2016); Tubal ligation (03/02/2016); Refractive surgery (03/02/2016); Tonsillectomy (03/02/2016); and Other surgical history (05/29/2019).     Allergies  Amoxicillin and Penicillins    Social History  She reports that she has never smoked. She has never used smokeless tobacco. She reports current alcohol use. She reports that she does not use drugs.    Family History  No family history on file.    Review of Systems  Review of Systems:  Constitutional:  no fever, no chills, no significant  "weight change  Neurological: As above.  No history of CVA or seizure disorder  Eyes: As above.  ENT:  No recent hearing loss  Neck: No pain  Cardiovascular: No chest pain, no history of cardiac disease such as myocardial infarction or arrhythmia or congestive heart failure  Pulmonary: No shortness of breath, no history of pulmonary disease such as pneumonia or COPD  Breast: No history of breast disease or breast mass  Gastrointestinal: As above.  No constipation or diarrhea or blood in the stool.  No history of ulcers.  No liver or pancreas disease.  No intestinal disorder.  Genitourinary: No hematuria or dysuria, no kidney disease  Musculoskeletal: As above.  Integumentary:  no rash  Psychiatric:  No anxiety or depression  Endocrine:  no history of diabetes  Hematologic/Lymphatic: No easy bruising or bleeding    Last Recorded Vitals  Blood pressure 120/75, pulse 88, temperature 36.6 °C (97.8 °F), temperature source Temporal, resp. rate 16, height 1.676 m (5' 6\"), weight 79.2 kg (174 lb 9.6 oz), SpO2 96%.    Physical Exam  Constitutional: Well-developed, well-nourished, alert and oriented, no acute distress  Skin: Warm and dry, no lesions, no rashes, no jaundice  HEENT: Normocephalic, atraumatic, EOMI, no scleral icterus, eyes have no redness or swelling or discharge, external inspection of ears and nose is normal, mucous membranes moist  Neck: Soft, nontender, no mass or adenopathy  Cardiac: Regular rate and rhythm, no murmur  Chest: Patent airway, clear to auscultation, normal breath sounds with good chest expansion, no wheezes or rales or rhonchi noted, thorax symmetric  Abdomen: Nondistended, laparoscopy scars, positive bowel sounds, soft, nontender, no mass  Rectal: Not performed  Extremities: No injury, no lower extremity edema or calf tenderness  Lymphatic: No cervical adenopathy  Musculoskeletal: Range of motion intact, no joint swelling, normal strength  Neurological: Alert and oriented x3, intact sensory " and motor function, no obvious focal neurologic abnormalities, normal gait  Psychological: Appropriate mood and behavior  Examination chaperoned by Estefany Webb    Relevant Results  Labs from November 22, 2024: WBC 4.8, hemoglobin 14.4, platelet 260  BUN 27, BMP otherwise normal.  Liver function tests normal    I reviewed the biliary ultrasound report from February 11, 2025.  Findings:   Liver:   The liver measures approximately 16.2 cm in length. Hepatic echogenicity is heterogeneous and increased. The differential includes fatty infiltration and hepatocellular disease. Hepatic elastography may provide additional information if clinically indicated.. . No definite discrete focal lesion is identified within the liver by ultrasound.   Bile ducts:   There is no dilatation of the intrahepatic biliary tree. The common bile duct measures 0.4 cm which is within the range of normal.   Gallbladder:   The gallbladder is free of mobile stones, sludge, wall thickening, pericholecystic fluid, or positive Prince sign. There is a 0.4 x 0.5 x 0.7 cm diameter nonmobile bile echogenic focus along the wall of the gallbladder compatible with a gallbladder wall polyp; previously 0.4 cm. There may be mild interval enlargement. Continued follow-up is recommended.   Pancreas:   No definite discrete focal lesion is identified within the visualized pancreas by ultrasound..   Right kidney:   The right kidney measures 10.4 x 5.3 x 5.1 cm. Right renal cortical echogenicity and cortical medullary differentiation is maintained. The diameter of the right renal cortex measures 0.6 cm. There is no evidence of hydronephrosis. . No solid or cystic renal masses are identified. There is no right perinephric fluid.   Impression:   Heterogeneous and increased hepatic echogenicity. The differential includes fatty infiltration and hepatocellular disease.   0.4 x 0.5 x 0.7 cm diameter gallbladder wall polyp; previously 0.4 cm. There may be mild interval  enlargement.   Otherwise, unremarkable right upper quadrant ultrasound.     Assessment/Plan   Diagnoses and all orders for this visit:  Gallbladder polyp    69-year-old female with a 10-year history of a gallbladder polyp which measured 0.7 cm diameter on recent ultrasound.  Reported to previously measure 0.4 cm diameter on January 25, 2023 ultrasound.  The radiologist feels there may be mild interval enlargement and recommends continued follow-up.  Follow-up in 1 year with repeat right upper quadrant ultrasound.    Soto Niño MD

## 2025-06-04 DIAGNOSIS — I15.9 BENIGN SECONDARY HYPERTENSION: ICD-10-CM

## 2025-06-04 RX ORDER — HYDROCHLOROTHIAZIDE 25 MG/1
25 TABLET ORAL DAILY
Qty: 90 TABLET | Refills: 3 | Status: SHIPPED | OUTPATIENT
Start: 2025-06-04